# Patient Record
Sex: MALE | Race: WHITE | NOT HISPANIC OR LATINO
[De-identification: names, ages, dates, MRNs, and addresses within clinical notes are randomized per-mention and may not be internally consistent; named-entity substitution may affect disease eponyms.]

---

## 2017-01-05 ENCOUNTER — APPOINTMENT (OUTPATIENT)
Dept: NEUROSURGERY | Facility: CLINIC | Age: 67
End: 2017-01-05

## 2017-01-05 ENCOUNTER — OUTPATIENT (OUTPATIENT)
Dept: OUTPATIENT SERVICES | Facility: HOSPITAL | Age: 67
LOS: 1 days | End: 2017-01-05
Payer: COMMERCIAL

## 2017-01-05 DIAGNOSIS — E11.65 TYPE 2 DIABETES MELLITUS WITH HYPERGLYCEMIA: ICD-10-CM

## 2017-01-05 DIAGNOSIS — M54.16 RADICULOPATHY, LUMBAR REGION: ICD-10-CM

## 2017-01-05 PROCEDURE — 82962 GLUCOSE BLOOD TEST: CPT

## 2017-01-05 PROCEDURE — 77003 FLUOROGUIDE FOR SPINE INJECT: CPT

## 2017-01-05 PROCEDURE — 62323 NJX INTERLAMINAR LMBR/SAC: CPT

## 2017-01-17 ENCOUNTER — APPOINTMENT (OUTPATIENT)
Dept: NEUROSURGERY | Facility: CLINIC | Age: 67
End: 2017-01-17

## 2017-01-17 VITALS
WEIGHT: 215 LBS | SYSTOLIC BLOOD PRESSURE: 136 MMHG | DIASTOLIC BLOOD PRESSURE: 78 MMHG | BODY MASS INDEX: 31.84 KG/M2 | HEART RATE: 89 BPM | HEIGHT: 69 IN

## 2017-08-07 ENCOUNTER — OUTPATIENT (OUTPATIENT)
Dept: OUTPATIENT SERVICES | Facility: HOSPITAL | Age: 67
LOS: 1 days | End: 2017-08-07
Payer: COMMERCIAL

## 2017-08-07 ENCOUNTER — TRANSCRIPTION ENCOUNTER (OUTPATIENT)
Age: 67
End: 2017-08-07

## 2017-08-07 ENCOUNTER — APPOINTMENT (OUTPATIENT)
Dept: NEUROSURGERY | Facility: CLINIC | Age: 67
End: 2017-08-07
Payer: COMMERCIAL

## 2017-08-07 DIAGNOSIS — M54.16 RADICULOPATHY, LUMBAR REGION: ICD-10-CM

## 2017-08-07 PROCEDURE — 62323 NJX INTERLAMINAR LMBR/SAC: CPT

## 2017-08-07 PROCEDURE — 82962 GLUCOSE BLOOD TEST: CPT

## 2017-08-10 DIAGNOSIS — E11.65 TYPE 2 DIABETES MELLITUS WITH HYPERGLYCEMIA: ICD-10-CM

## 2017-08-31 ENCOUNTER — RX RENEWAL (OUTPATIENT)
Age: 67
End: 2017-08-31

## 2017-12-17 ENCOUNTER — RX RENEWAL (OUTPATIENT)
Age: 67
End: 2017-12-17

## 2018-01-23 ENCOUNTER — MESSAGE (OUTPATIENT)
Age: 68
End: 2018-01-23

## 2018-03-05 ENCOUNTER — TRANSCRIPTION ENCOUNTER (OUTPATIENT)
Age: 68
End: 2018-03-05

## 2018-03-05 ENCOUNTER — APPOINTMENT (OUTPATIENT)
Dept: NEUROSURGERY | Facility: CLINIC | Age: 68
End: 2018-03-05
Payer: COMMERCIAL

## 2018-03-05 ENCOUNTER — OUTPATIENT (OUTPATIENT)
Dept: OUTPATIENT SERVICES | Facility: HOSPITAL | Age: 68
LOS: 1 days | End: 2018-03-05
Payer: COMMERCIAL

## 2018-03-05 DIAGNOSIS — M54.16 RADICULOPATHY, LUMBAR REGION: ICD-10-CM

## 2018-03-05 PROCEDURE — 62323 NJX INTERLAMINAR LMBR/SAC: CPT

## 2018-03-05 PROCEDURE — 82962 GLUCOSE BLOOD TEST: CPT

## 2018-03-13 DIAGNOSIS — E11.65 TYPE 2 DIABETES MELLITUS WITH HYPERGLYCEMIA: ICD-10-CM

## 2018-03-20 ENCOUNTER — APPOINTMENT (OUTPATIENT)
Dept: NEUROSURGERY | Facility: CLINIC | Age: 68
End: 2018-03-20
Payer: COMMERCIAL

## 2018-03-20 VITALS
DIASTOLIC BLOOD PRESSURE: 90 MMHG | HEART RATE: 89 BPM | SYSTOLIC BLOOD PRESSURE: 157 MMHG | BODY MASS INDEX: 32.29 KG/M2 | HEIGHT: 69 IN | WEIGHT: 218 LBS

## 2018-03-20 PROCEDURE — 99213 OFFICE O/P EST LOW 20 MIN: CPT

## 2018-06-19 ENCOUNTER — RX RENEWAL (OUTPATIENT)
Age: 68
End: 2018-06-19

## 2018-09-03 ENCOUNTER — RX RENEWAL (OUTPATIENT)
Age: 68
End: 2018-09-03

## 2019-01-28 ENCOUNTER — MESSAGE (OUTPATIENT)
Age: 69
End: 2019-01-28

## 2019-02-06 ENCOUNTER — APPOINTMENT (OUTPATIENT)
Dept: NEUROSURGERY | Facility: CLINIC | Age: 69
End: 2019-02-06
Payer: COMMERCIAL

## 2019-02-06 VITALS
HEIGHT: 69 IN | HEART RATE: 96 BPM | SYSTOLIC BLOOD PRESSURE: 156 MMHG | BODY MASS INDEX: 32.44 KG/M2 | DIASTOLIC BLOOD PRESSURE: 79 MMHG | WEIGHT: 219 LBS

## 2019-02-06 PROCEDURE — 99213 OFFICE O/P EST LOW 20 MIN: CPT

## 2019-02-06 NOTE — ASSESSMENT
[FreeTextEntry1] : 68 year old male with low back and lumbar radicular pain now returned.  He will follow up with me within the week for his procedure.

## 2019-02-06 NOTE — REASON FOR VISIT
[Follow-Up: _____] : a [unfilled] follow-up visit [FreeTextEntry1] : Patient returns with a recurrence of his back and leg pain.  His last epidural injection was about 11 months ago.  He felt 60-70% improved as compared to prior the procedure.  His relief lasted about 9 months.  He is interested in a repeat injection.

## 2019-02-07 ENCOUNTER — APPOINTMENT (OUTPATIENT)
Dept: NEUROSURGERY | Facility: CLINIC | Age: 69
End: 2019-02-07

## 2019-02-27 ENCOUNTER — TRANSCRIPTION ENCOUNTER (OUTPATIENT)
Age: 69
End: 2019-02-27

## 2019-02-28 ENCOUNTER — APPOINTMENT (OUTPATIENT)
Dept: NEUROSURGERY | Facility: CLINIC | Age: 69
End: 2019-02-28
Payer: COMMERCIAL

## 2019-02-28 ENCOUNTER — OUTPATIENT (OUTPATIENT)
Dept: OUTPATIENT SERVICES | Facility: HOSPITAL | Age: 69
LOS: 1 days | End: 2019-02-28
Payer: COMMERCIAL

## 2019-02-28 DIAGNOSIS — M54.16 RADICULOPATHY, LUMBAR REGION: ICD-10-CM

## 2019-02-28 PROCEDURE — 62323 NJX INTERLAMINAR LMBR/SAC: CPT

## 2019-03-20 ENCOUNTER — APPOINTMENT (OUTPATIENT)
Dept: NEUROSURGERY | Facility: CLINIC | Age: 69
End: 2019-03-20
Payer: COMMERCIAL

## 2019-03-20 VITALS
SYSTOLIC BLOOD PRESSURE: 138 MMHG | HEART RATE: 85 BPM | BODY MASS INDEX: 32.44 KG/M2 | HEIGHT: 69 IN | DIASTOLIC BLOOD PRESSURE: 76 MMHG | WEIGHT: 219 LBS

## 2019-03-20 PROCEDURE — 99213 OFFICE O/P EST LOW 20 MIN: CPT

## 2019-03-20 NOTE — REASON FOR VISIT
[Follow-Up: _____] : a [unfilled] follow-up visit [FreeTextEntry1] : Patient returns after his most recent LESI.  He states that he feels significantly better.  His current pain intensity is 2/10.  He is here to discuss his progress.

## 2019-03-20 NOTE — ASSESSMENT
[FreeTextEntry1] : 68 year old male with low back and lumbar radicular pain now improved.  He will continue with his home exercises and return to see me at the earliest sign that his pain returns for further injection therapy as necessary.

## 2019-06-13 ENCOUNTER — RX RENEWAL (OUTPATIENT)
Age: 69
End: 2019-06-13

## 2019-07-28 ENCOUNTER — TRANSCRIPTION ENCOUNTER (OUTPATIENT)
Age: 69
End: 2019-07-28

## 2019-07-29 ENCOUNTER — APPOINTMENT (OUTPATIENT)
Dept: NEUROSURGERY | Facility: CLINIC | Age: 69
End: 2019-07-29
Payer: COMMERCIAL

## 2019-07-29 ENCOUNTER — OUTPATIENT (OUTPATIENT)
Dept: OUTPATIENT SERVICES | Facility: HOSPITAL | Age: 69
LOS: 1 days | End: 2019-07-29
Payer: COMMERCIAL

## 2019-07-29 DIAGNOSIS — M54.16 RADICULOPATHY, LUMBAR REGION: ICD-10-CM

## 2019-07-29 PROCEDURE — 62323 NJX INTERLAMINAR LMBR/SAC: CPT

## 2019-08-14 ENCOUNTER — APPOINTMENT (OUTPATIENT)
Dept: NEUROSURGERY | Facility: CLINIC | Age: 69
End: 2019-08-14
Payer: COMMERCIAL

## 2019-08-14 VITALS
BODY MASS INDEX: 31.55 KG/M2 | DIASTOLIC BLOOD PRESSURE: 84 MMHG | HEIGHT: 69 IN | SYSTOLIC BLOOD PRESSURE: 126 MMHG | HEART RATE: 88 BPM | WEIGHT: 213 LBS

## 2019-08-14 PROCEDURE — 99213 OFFICE O/P EST LOW 20 MIN: CPT

## 2019-08-14 NOTE — ASSESSMENT
[FreeTextEntry1] : 68 year old male with low back and lumbar radicular pain now improved.  He will continue with his home exercises and return to see me at the earliest sign that his pain returns for further injection therapy as necessary.  I will renew his Meloxicam.

## 2019-08-14 NOTE — REASON FOR VISIT
[Follow-Up: _____] : a [unfilled] follow-up visit [FreeTextEntry1] : Patient returns after his most recent LESI.  He states that he feels 60% improved following his procedure.  His pain level has reduced from 9/10 to 3/10.  He is here to discuss his progress.

## 2019-11-06 ENCOUNTER — RX RENEWAL (OUTPATIENT)
Age: 69
End: 2019-11-06

## 2019-11-08 ENCOUNTER — MEDICATION RENEWAL (OUTPATIENT)
Age: 69
End: 2019-11-08

## 2020-02-09 ENCOUNTER — TRANSCRIPTION ENCOUNTER (OUTPATIENT)
Age: 70
End: 2020-02-09

## 2020-02-10 ENCOUNTER — APPOINTMENT (OUTPATIENT)
Dept: NEUROSURGERY | Facility: CLINIC | Age: 70
End: 2020-02-10
Payer: COMMERCIAL

## 2020-02-10 ENCOUNTER — OUTPATIENT (OUTPATIENT)
Dept: OUTPATIENT SERVICES | Facility: HOSPITAL | Age: 70
LOS: 1 days | End: 2020-02-10
Payer: COMMERCIAL

## 2020-02-10 DIAGNOSIS — M54.16 RADICULOPATHY, LUMBAR REGION: ICD-10-CM

## 2020-02-10 PROCEDURE — 62323 NJX INTERLAMINAR LMBR/SAC: CPT

## 2020-02-10 PROCEDURE — 82962 GLUCOSE BLOOD TEST: CPT

## 2020-02-18 DIAGNOSIS — E11.65 TYPE 2 DIABETES MELLITUS WITH HYPERGLYCEMIA: ICD-10-CM

## 2020-02-28 ENCOUNTER — APPOINTMENT (OUTPATIENT)
Dept: NEUROSURGERY | Facility: CLINIC | Age: 70
End: 2020-02-28
Payer: COMMERCIAL

## 2020-02-28 VITALS
DIASTOLIC BLOOD PRESSURE: 87 MMHG | BODY MASS INDEX: 32.44 KG/M2 | HEART RATE: 98 BPM | SYSTOLIC BLOOD PRESSURE: 169 MMHG | HEIGHT: 69 IN | WEIGHT: 219 LBS

## 2020-02-28 PROCEDURE — 99213 OFFICE O/P EST LOW 20 MIN: CPT

## 2020-02-28 NOTE — REASON FOR VISIT
[Follow-Up: _____] : a [unfilled] follow-up visit [FreeTextEntry1] : Patient returns after his most recent LESI a few weeks ago.  He state that he feels 60% improved as compared to prior to his procedure.  He is here to discuss his progress.

## 2020-02-28 NOTE — ASSESSMENT
[FreeTextEntry1] : 69 year old male with low back and lumbar radicular pain now moderately improved.  He will continue with Meloxicam as needed for pain relief.  He will follow up with me at the earliest sign that his pain returns for further injection therapy as needed.

## 2020-05-21 ENCOUNTER — RX RENEWAL (OUTPATIENT)
Age: 70
End: 2020-05-21

## 2020-08-06 DIAGNOSIS — Z01.818 ENCOUNTER FOR OTHER PREPROCEDURAL EXAMINATION: ICD-10-CM

## 2020-08-07 ENCOUNTER — APPOINTMENT (OUTPATIENT)
Dept: DISASTER EMERGENCY | Facility: CLINIC | Age: 70
End: 2020-08-07

## 2020-08-07 LAB — SARS-COV-2 N GENE NPH QL NAA+PROBE: NOT DETECTED

## 2020-08-09 ENCOUNTER — TRANSCRIPTION ENCOUNTER (OUTPATIENT)
Age: 70
End: 2020-08-09

## 2020-08-10 ENCOUNTER — OUTPATIENT (OUTPATIENT)
Dept: OUTPATIENT SERVICES | Facility: HOSPITAL | Age: 70
LOS: 1 days | End: 2020-08-10
Payer: COMMERCIAL

## 2020-08-10 ENCOUNTER — APPOINTMENT (OUTPATIENT)
Dept: NEUROSURGERY | Facility: CLINIC | Age: 70
End: 2020-08-10
Payer: COMMERCIAL

## 2020-08-10 DIAGNOSIS — M54.16 RADICULOPATHY, LUMBAR REGION: ICD-10-CM

## 2020-08-10 PROCEDURE — 82962 GLUCOSE BLOOD TEST: CPT

## 2020-08-10 PROCEDURE — 62323 NJX INTERLAMINAR LMBR/SAC: CPT

## 2020-08-11 DIAGNOSIS — E11.65 TYPE 2 DIABETES MELLITUS WITH HYPERGLYCEMIA: ICD-10-CM

## 2020-08-26 ENCOUNTER — APPOINTMENT (OUTPATIENT)
Dept: NEUROSURGERY | Facility: CLINIC | Age: 70
End: 2020-08-26
Payer: COMMERCIAL

## 2020-08-26 VITALS
HEART RATE: 97 BPM | SYSTOLIC BLOOD PRESSURE: 167 MMHG | DIASTOLIC BLOOD PRESSURE: 83 MMHG | BODY MASS INDEX: 32.44 KG/M2 | HEIGHT: 69 IN | WEIGHT: 219 LBS

## 2020-08-26 VITALS — TEMPERATURE: 98.3 F

## 2020-08-26 PROCEDURE — 99213 OFFICE O/P EST LOW 20 MIN: CPT

## 2020-08-26 NOTE — ASSESSMENT
[FreeTextEntry1] : 69 year old male with low back and lumbar radicular pain now improved following his most recent LESI.  He will continue with Meloxicam as needed for pain and return to see me at the earliest sign that his pain returns for further injection therapy as necessary.

## 2020-08-26 NOTE — REASON FOR VISIT
[Follow-Up: _____] : a [unfilled] follow-up visit [FreeTextEntry1] : Patient returns after his most recent LESI.  He states he feels 70-75% improved as compared to prior to his procedure.  He is pleased with the results and returns for a follow up visit today.

## 2020-09-28 ENCOUNTER — APPOINTMENT (OUTPATIENT)
Dept: DISASTER EMERGENCY | Facility: CLINIC | Age: 70
End: 2020-09-28

## 2020-09-29 LAB — SARS-COV-2 N GENE NPH QL NAA+PROBE: NOT DETECTED

## 2020-09-30 ENCOUNTER — TRANSCRIPTION ENCOUNTER (OUTPATIENT)
Age: 70
End: 2020-09-30

## 2020-10-01 ENCOUNTER — OUTPATIENT (OUTPATIENT)
Dept: OUTPATIENT SERVICES | Facility: HOSPITAL | Age: 70
LOS: 1 days | Discharge: HOME | End: 2020-10-01

## 2020-10-01 LAB — GLUCOSE BLDC GLUCOMTR-MCNC: 165 MG/DL — HIGH (ref 70–99)

## 2020-11-16 ENCOUNTER — RX RENEWAL (OUTPATIENT)
Age: 70
End: 2020-11-16

## 2021-11-08 ENCOUNTER — APPOINTMENT (OUTPATIENT)
Dept: DISASTER EMERGENCY | Facility: CLINIC | Age: 71
End: 2021-11-08

## 2021-11-08 LAB — SARS-COV-2 N GENE NPH QL NAA+PROBE: NOT DETECTED

## 2021-11-09 ENCOUNTER — APPOINTMENT (OUTPATIENT)
Dept: PHYSICAL MEDICINE AND REHAB | Facility: CLINIC | Age: 71
End: 2021-11-09

## 2021-11-10 ENCOUNTER — TRANSCRIPTION ENCOUNTER (OUTPATIENT)
Age: 71
End: 2021-11-10

## 2021-11-11 ENCOUNTER — APPOINTMENT (OUTPATIENT)
Dept: PHYSICAL MEDICINE AND REHAB | Facility: AMBULATORY MEDICAL SERVICES | Age: 71
End: 2021-11-11
Payer: MEDICARE

## 2021-11-11 ENCOUNTER — OUTPATIENT (OUTPATIENT)
Dept: OUTPATIENT SERVICES | Facility: HOSPITAL | Age: 71
LOS: 1 days | End: 2021-11-11
Payer: MEDICARE

## 2021-11-11 DIAGNOSIS — M54.16 RADICULOPATHY, LUMBAR REGION: ICD-10-CM

## 2021-11-11 PROCEDURE — 62323 NJX INTERLAMINAR LMBR/SAC: CPT

## 2021-12-08 ENCOUNTER — APPOINTMENT (OUTPATIENT)
Dept: PHYSICAL MEDICINE AND REHAB | Facility: CLINIC | Age: 71
End: 2021-12-08
Payer: MEDICARE

## 2021-12-08 PROCEDURE — 99213 OFFICE O/P EST LOW 20 MIN: CPT

## 2021-12-08 NOTE — ASSESSMENT
[FreeTextEntry1] : 70 year old male with low back and lumbar radicular pain now significantly improved following his most recent LESI.  He will continue with his Meloxicam and I will provide with a refill.  He will follow up with me at the earliest sign that his pain returns for further injection therapy as necessary.

## 2021-12-08 NOTE — HISTORY OF PRESENT ILLNESS
[FreeTextEntry1] : Patient returns after his most recent LESI a few weeks ago.  The patient reports 60% improvement in the symptoms.  The patient is pleased with the results and returns for a follow up visit today.\par

## 2022-02-08 ENCOUNTER — RX RENEWAL (OUTPATIENT)
Age: 72
End: 2022-02-08

## 2022-09-20 LAB — SARS-COV-2 N GENE NPH QL NAA+PROBE: NOT DETECTED

## 2022-09-22 ENCOUNTER — APPOINTMENT (OUTPATIENT)
Dept: PHYSICAL MEDICINE AND REHAB | Facility: CLINIC | Age: 72
End: 2022-09-22

## 2022-09-22 ENCOUNTER — OUTPATIENT (OUTPATIENT)
Dept: OUTPATIENT SERVICES | Facility: HOSPITAL | Age: 72
LOS: 1 days | End: 2022-09-22
Payer: MEDICARE

## 2022-09-22 DIAGNOSIS — M54.16 RADICULOPATHY, LUMBAR REGION: ICD-10-CM

## 2022-09-22 PROCEDURE — 62323 NJX INTERLAMINAR LMBR/SAC: CPT

## 2022-10-07 ENCOUNTER — APPOINTMENT (OUTPATIENT)
Dept: PHYSICAL MEDICINE AND REHAB | Facility: CLINIC | Age: 72
End: 2022-10-07

## 2022-10-07 VITALS
DIASTOLIC BLOOD PRESSURE: 80 MMHG | SYSTOLIC BLOOD PRESSURE: 167 MMHG | HEART RATE: 81 BPM | TEMPERATURE: 97.52 F | OXYGEN SATURATION: 95 %

## 2022-10-07 PROCEDURE — 99213 OFFICE O/P EST LOW 20 MIN: CPT

## 2022-10-07 NOTE — ASSESSMENT
[FreeTextEntry1] : 71 year old male with low back and lumbar radicular pain now moderately improved following his first LESI this year.  He will continue with his home exercises and return to see me at the earliest sign that his pain returns for further injection therapy as necessary.

## 2022-10-07 NOTE — HISTORY OF PRESENT ILLNESS
[FreeTextEntry1] : Patient returns after his first LESI this year a few weeks ago.  The patient reports 70% improvement in the symptoms.  The patient is pleased with the results and returns for a follow up visit today.\par

## 2023-07-25 ENCOUNTER — NON-APPOINTMENT (OUTPATIENT)
Age: 73
End: 2023-07-25

## 2023-07-27 ENCOUNTER — APPOINTMENT (OUTPATIENT)
Dept: PHYSICAL MEDICINE AND REHAB | Facility: CLINIC | Age: 73
End: 2023-07-27
Payer: MEDICARE

## 2023-07-27 ENCOUNTER — OUTPATIENT (OUTPATIENT)
Dept: OUTPATIENT SERVICES | Facility: HOSPITAL | Age: 73
LOS: 1 days | End: 2023-07-27
Payer: MEDICARE

## 2023-07-27 DIAGNOSIS — M54.16 RADICULOPATHY, LUMBAR REGION: ICD-10-CM

## 2023-07-27 PROCEDURE — 62323 NJX INTERLAMINAR LMBR/SAC: CPT

## 2023-07-27 PROCEDURE — 82962 GLUCOSE BLOOD TEST: CPT

## 2023-07-27 NOTE — PROCEDURE
[de-identified] : [facility]\par [facility]\par [address], [place], [address] - [phone]\par \par PATIENT: GARY SCHNEIDER \par MEDICAL RECORD #: \par DATE OF OPERATION: 07/27/2023 \par PREOPERATIVE DIAGNOSIS:  LUMBAR RADICULAR PAIN\par POSTOPERATIVE DIAGNOSIS:  LUMBAR RADICULAR PAIN\par PROCEDURE: LUMBAR EPIDURAL STEROID INJECTION UNDER X-RAY GUIDANCE\par SURGEON:  DARWIN BROWN M.D.\par ANESTHEISA:  LOCAL\par EBL:  MINIMAL\par \par INDICATIONS:  The patient returns to Pain Management with persistent lower back pain with radiation down the left leg.  The pain has remained quite significant and interferes with the patient’s ability to perform ADLs despite the implementation of other conservative measures.  I discussed with the patient at length the risks, benefits, and expectations of the aforementioned procedure.  All of the patient’s questions were answered.  The patient signed informed consent and agreed to proceed.\par \par PROCEDURE:  The patient was transported to the operating room, placed in the prone position and monitored non-invasively with stable vital signs and no complaints.  The patient’s back was prepped three times with betadine and draped in meticulous sterile fashion.  The L4-L5 interspace was identified fluoroscopically and the skin overlying this level was infiltrated with 5cc of 1% preservative-free lidocaine using a 25 gauge one inch needle.  The epidural space was approached and entered at this level with a 20 gauge Tuohy needle by loss of resistance technique.\par \par Following loss of resistance to air, aspiration was negative for CSF or heme.  Then, 2cc of Omnipaque 240 were injected and the epidurogram revealed spread from L4 to S1 in the posterior epidural space bilaterally with left-sided predominance and no distinct cutoff.  Depo-Medrol 80mg was then injected with 1cc of preservative-free 1% lidocaine.  The needle tract was flushed with 2cc of 1% lidocaine and the needle was removed.  A sterile bandage was applied.\par \par The patient tolerated the procedure well without complaint or complication.  The patient was observed in stable condition after the procedure for approximately 30 minutes before being discharged to home in the company of an adult.  The patient was provided with full written instructions.  The patient will be seen for follow-up in my office in 1 week but certainly sooner with any questions or concerns.\par \par \par \par Darwin Brown M.D.\par \par \par

## 2023-07-28 DIAGNOSIS — E11.65 TYPE 2 DIABETES MELLITUS WITH HYPERGLYCEMIA: ICD-10-CM

## 2023-08-11 ENCOUNTER — APPOINTMENT (OUTPATIENT)
Dept: PHYSICAL MEDICINE AND REHAB | Facility: CLINIC | Age: 73
End: 2023-08-11
Payer: MEDICARE

## 2023-08-11 PROCEDURE — 99213 OFFICE O/P EST LOW 20 MIN: CPT

## 2023-08-11 NOTE — ASSESSMENT
[FreeTextEntry1] : 72 year old male with low back pain and lumbar radicular pain now significantly improved following his most recent injection.  I will renew his Meloxicam.  He will return to see me at the earliest sign that his pain returns for further injection therapy as necessary.

## 2023-11-06 ENCOUNTER — OUTPATIENT (OUTPATIENT)
Dept: OUTPATIENT SERVICES | Facility: HOSPITAL | Age: 73
LOS: 1 days | End: 2023-11-06
Payer: MEDICARE

## 2023-11-06 ENCOUNTER — APPOINTMENT (OUTPATIENT)
Dept: PHYSICAL MEDICINE AND REHAB | Facility: CLINIC | Age: 73
End: 2023-11-06
Payer: MEDICARE

## 2023-11-06 DIAGNOSIS — M54.16 RADICULOPATHY, LUMBAR REGION: ICD-10-CM

## 2023-11-06 PROCEDURE — 62323 NJX INTERLAMINAR LMBR/SAC: CPT

## 2023-11-21 ENCOUNTER — APPOINTMENT (OUTPATIENT)
Dept: PHYSICAL MEDICINE AND REHAB | Facility: CLINIC | Age: 73
End: 2023-11-21
Payer: MEDICARE

## 2023-11-21 PROCEDURE — 99214 OFFICE O/P EST MOD 30 MIN: CPT

## 2023-11-27 ENCOUNTER — APPOINTMENT (OUTPATIENT)
Dept: PHYSICAL MEDICINE AND REHAB | Facility: CLINIC | Age: 73
End: 2023-11-27
Payer: MEDICARE

## 2023-11-27 ENCOUNTER — OUTPATIENT (OUTPATIENT)
Dept: OUTPATIENT SERVICES | Facility: HOSPITAL | Age: 73
LOS: 1 days | End: 2023-11-27
Payer: MEDICARE

## 2023-11-27 DIAGNOSIS — M54.16 RADICULOPATHY, LUMBAR REGION: ICD-10-CM

## 2023-11-27 PROCEDURE — 62323 NJX INTERLAMINAR LMBR/SAC: CPT

## 2023-12-12 ENCOUNTER — APPOINTMENT (OUTPATIENT)
Dept: PHYSICAL MEDICINE AND REHAB | Facility: CLINIC | Age: 73
End: 2023-12-12
Payer: MEDICARE

## 2023-12-12 PROCEDURE — 99213 OFFICE O/P EST LOW 20 MIN: CPT

## 2023-12-14 ENCOUNTER — OUTPATIENT (OUTPATIENT)
Dept: OUTPATIENT SERVICES | Facility: HOSPITAL | Age: 73
LOS: 1 days | End: 2023-12-14
Payer: MEDICARE

## 2023-12-14 ENCOUNTER — APPOINTMENT (OUTPATIENT)
Dept: MRI IMAGING | Facility: CLINIC | Age: 73
End: 2023-12-14
Payer: MEDICARE

## 2023-12-14 DIAGNOSIS — M54.16 RADICULOPATHY, LUMBAR REGION: ICD-10-CM

## 2023-12-14 DIAGNOSIS — M47.816 SPONDYLOSIS WITHOUT MYELOPATHY OR RADICULOPATHY, LUMBAR REGION: ICD-10-CM

## 2023-12-14 DIAGNOSIS — M48.061 SPINAL STENOSIS, LUMBAR REGION WITHOUT NEUROGENIC CLAUDICATION: ICD-10-CM

## 2023-12-14 DIAGNOSIS — M54.50 LOW BACK PAIN, UNSPECIFIED: ICD-10-CM

## 2023-12-14 PROCEDURE — 72148 MRI LUMBAR SPINE W/O DYE: CPT | Mod: 26,MH

## 2023-12-14 PROCEDURE — 72148 MRI LUMBAR SPINE W/O DYE: CPT

## 2024-02-15 ENCOUNTER — OUTPATIENT (OUTPATIENT)
Dept: OUTPATIENT SERVICES | Facility: HOSPITAL | Age: 74
LOS: 1 days | End: 2024-02-15
Payer: MEDICARE

## 2024-02-15 ENCOUNTER — APPOINTMENT (OUTPATIENT)
Dept: PHYSICAL MEDICINE AND REHAB | Facility: CLINIC | Age: 74
End: 2024-02-15
Payer: MEDICARE

## 2024-02-15 DIAGNOSIS — M54.16 RADICULOPATHY, LUMBAR REGION: ICD-10-CM

## 2024-02-15 PROCEDURE — 62323 NJX INTERLAMINAR LMBR/SAC: CPT

## 2024-02-15 NOTE — PROCEDURE
[de-identified] : [facility] [address], [place], [address] - [phone]  PATIENT: GARY SCHNEIDER  MEDICAL RECORD #:  DATE OF OPERATION: 02/15/2024  PREOPERATIVE DIAGNOSIS:  LUMBAR RADICULAR PAIN POSTOPERATIVE DIAGNOSIS:  LUMBAR RADICULAR PAIN PROCEDURE: LUMBAR EPIDURAL STEROID INJECTION UNDER X-RAY GUIDANCE SURGEON:  DARWIN BROWN M.D. ANESTHEISA:  LOCAL EBL:  MINIMAL  INDICATIONS:  The patient returns to Pain Management with persistent lower back pain with radiation down the left leg.  The pain has remained quite significant and interferes with the patients ability to perform ADLs despite the implementation of other conservative measures.  I discussed with the patient at length the risks, benefits, and expectations of the aforementioned procedure.  All of the patients questions were answered.  The patient signed informed consent and agreed to proceed.  PROCEDURE:  The patient was transported to the operating room, placed in the prone position and monitored non-invasively with stable vital signs and no complaints.  The patients back was prepped three times with betadine and draped in meticulous sterile fashion.  The L4-L5 interspace was identified fluoroscopically and the skin overlying this level was infiltrated with 5cc of 1% preservative-free lidocaine using a 25 gauge one inch needle.  The epidural space was approached and entered at this level with a 20 gauge Tuohy needle by loss of resistance technique.  Following loss of resistance to air, aspiration was negative for CSF or heme.  Then, 2cc of Omnipaque 240 were injected and the epidurogram revealed spread from L4 to S1 in the posterior epidural space bilaterally with left-sided predominance and no distinct cutoff.  Depo-Medrol 80mg was then injected with 1cc of preservative-free 1% lidocaine.  The needle tract was flushed with 2cc of 1% lidocaine and the needle was removed.  A sterile bandage was applied.  The patient tolerated the procedure well without complaint or complication.  The patient was observed in stable condition after the procedure for approximately 30 minutes before being discharged to home in the company of an adult.  The patient was provided with full written instructions.  The patient will be seen for follow-up in my office in 1 week but certainly sooner with any questions or concerns.    Darwin Brown M.D.

## 2024-03-01 ENCOUNTER — APPOINTMENT (OUTPATIENT)
Dept: PHYSICAL MEDICINE AND REHAB | Facility: CLINIC | Age: 74
End: 2024-03-01
Payer: MEDICARE

## 2024-03-01 PROCEDURE — 99213 OFFICE O/P EST LOW 20 MIN: CPT

## 2024-03-01 NOTE — HISTORY OF PRESENT ILLNESS
[FreeTextEntry1] : Patient returns after the most recent LESI a few weeks ago.  The patient reports 70-80% improvement in the symptoms.  The patient is pleased with the results and returns for a follow up visit today.  He is also been going to PT which seems to be helping.

## 2024-03-01 NOTE — ASSESSMENT
[FreeTextEntry1] : 73 year old male with low back pain and lumbar radicular pain now significantly improved.  He will continue with PT and I will renew his prescription.  He will follow up with me thereafter as necessary.

## 2024-05-09 ENCOUNTER — APPOINTMENT (OUTPATIENT)
Dept: PHYSICAL MEDICINE AND REHAB | Facility: CLINIC | Age: 74
End: 2024-05-09
Payer: MEDICARE

## 2024-05-09 ENCOUNTER — OUTPATIENT (OUTPATIENT)
Dept: OUTPATIENT SERVICES | Facility: HOSPITAL | Age: 74
LOS: 1 days | End: 2024-05-09
Payer: MEDICARE

## 2024-05-09 DIAGNOSIS — M54.16 RADICULOPATHY, LUMBAR REGION: ICD-10-CM

## 2024-05-09 PROCEDURE — 62323 NJX INTERLAMINAR LMBR/SAC: CPT

## 2024-05-09 PROCEDURE — 82962 GLUCOSE BLOOD TEST: CPT

## 2024-05-09 NOTE — PROCEDURE
[de-identified] : [facility] [address], [place], [address] - [phone]  PATIENT: GARY SCHNEIDER  MEDICAL RECORD #:  DATE OF OPERATION: 05/09/2024  PREOPERATIVE DIAGNOSIS:  LUMBAR RADICULAR PAIN POSTOPERATIVE DIAGNOSIS:  LUMBAR RADICULAR PAIN PROCEDURE: LUMBAR EPIDURAL STEROID INJECTION UNDER X-RAY GUIDANCE SURGEON:  DARWIN BROWN M.D. ANESTHEISA:  LOCAL EBL:  MINIMAL  INDICATIONS:  The patient returns to Pain Management with persistent lower back pain with radiation down the left leg.  The pain has remained quite significant and interferes with the patients ability to perform ADLs despite the implementation of other conservative measures.  I discussed with the patient at length the risks, benefits, and expectations of the aforementioned procedure.  All of the patients questions were answered.  The patient signed informed consent and agreed to proceed.  PROCEDURE:  The patient was transported to the operating room, placed in the prone position and monitored non-invasively with stable vital signs and no complaints.  The patients back was prepped three times with betadine and draped in meticulous sterile fashion.  The L4-L5 interspace was identified fluoroscopically and the skin overlying this level was infiltrated with 5cc of 1% preservative-free lidocaine using a 25 gauge one inch needle.  The epidural space was approached and entered at this level with a 20 gauge Tuohy needle by loss of resistance technique.  Following loss of resistance to air, aspiration was negative for CSF or heme.  Then, 2cc of Omnipaque 240 were injected and the epidurogram revealed spread from L4 to S1 in the posterior epidural space bilaterally with left-sided predominance and no distinct cutoff.  Depo-Medrol 80mg was then injected with 1cc of preservative-free 1% lidocaine.  The needle tract was flushed with 2cc of 1% lidocaine and the needle was removed.  A sterile bandage was applied.  The patient tolerated the procedure well without complaint or complication.  The patient was observed in stable condition after the procedure for approximately 30 minutes before being discharged to home in the company of an adult.  The patient was provided with full written instructions.  The patient will be seen for follow-up in my office in 1 week but certainly sooner with any questions or concerns.    Darwin Brown M.D.

## 2024-05-13 DIAGNOSIS — E11.65 TYPE 2 DIABETES MELLITUS WITH HYPERGLYCEMIA: ICD-10-CM

## 2024-05-22 ENCOUNTER — APPOINTMENT (OUTPATIENT)
Dept: PHYSICAL MEDICINE AND REHAB | Facility: CLINIC | Age: 74
End: 2024-05-22
Payer: MEDICARE

## 2024-05-22 DIAGNOSIS — M54.50 LOW BACK PAIN, UNSPECIFIED: ICD-10-CM

## 2024-05-22 DIAGNOSIS — M47.816 SPONDYLOSIS W/OUT MYELOPATHY OR RADICULOPATHY, LUMBAR REGION: ICD-10-CM

## 2024-05-22 DIAGNOSIS — M54.16 RADICULOPATHY, LUMBAR REGION: ICD-10-CM

## 2024-05-22 DIAGNOSIS — M48.061 SPINAL STENOSIS, LUMBAR REGION WITHOUT NEUROGENIC CLAUDICATION: ICD-10-CM

## 2024-05-22 PROCEDURE — 99213 OFFICE O/P EST LOW 20 MIN: CPT

## 2024-05-22 NOTE — HISTORY OF PRESENT ILLNESS
[FreeTextEntry1] : Patient returns after the most recent LESI a few weeks ago.  The patient reports 60-65% improvement in the symptoms.  The patient is pleased with the results and returns for a follow up visit today.  His current pain level is 3/10.

## 2024-05-22 NOTE — ASSESSMENT
[FreeTextEntry1] : 73 year old male with low back and lumbar radicular pain now moderately improved following his most recent LESI.  I will renew his PT and Meloxicam.  He will follow up with me as needed.

## 2024-09-23 ENCOUNTER — NON-APPOINTMENT (OUTPATIENT)
Age: 74
End: 2024-09-23

## 2024-09-23 ENCOUNTER — OUTPATIENT (OUTPATIENT)
Dept: OUTPATIENT SERVICES | Facility: HOSPITAL | Age: 74
LOS: 1 days | End: 2024-09-23
Payer: MEDICARE

## 2024-09-23 ENCOUNTER — APPOINTMENT (OUTPATIENT)
Dept: PHYSICAL MEDICINE AND REHAB | Facility: CLINIC | Age: 74
End: 2024-09-23
Payer: MEDICARE

## 2024-09-23 DIAGNOSIS — M54.50 LOW BACK PAIN, UNSPECIFIED: ICD-10-CM

## 2024-09-23 DIAGNOSIS — M47.816 SPONDYLOSIS W/OUT MYELOPATHY OR RADICULOPATHY, LUMBAR REGION: ICD-10-CM

## 2024-09-23 DIAGNOSIS — M48.061 SPINAL STENOSIS, LUMBAR REGION WITHOUT NEUROGENIC CLAUDICATION: ICD-10-CM

## 2024-09-23 DIAGNOSIS — M54.16 RADICULOPATHY, LUMBAR REGION: ICD-10-CM

## 2024-09-23 PROCEDURE — 82962 GLUCOSE BLOOD TEST: CPT

## 2024-09-23 PROCEDURE — 62323 NJX INTERLAMINAR LMBR/SAC: CPT

## 2024-09-23 NOTE — PROCEDURE
[de-identified] : [facility] [address] [place], [place], [address] - [phone]  PATIENT: GARY SCHNEIDER  MEDICAL RECORD #:  DATE OF OPERATION: 09/23/2024  PREOPERATIVE DIAGNOSIS:  LUMBAR RADICULAR PAIN POSTOPERATIVE DIAGNOSIS:  LUMBAR RADICULAR PAIN PROCEDURE: LUMBAR EPIDURAL STEROID INJECTION UNDER X-RAY GUIDANCE SURGEON:  DARWIN BROWN M.D. ANESTHEISA:  LOCAL EBL:  MINIMAL  INDICATIONS:  The patient returns to Pain Management with persistent lower back pain with radiation down the left leg.  The pain has remained quite significant and interferes with the patients ability to perform ADLs despite the implementation of other conservative measures.  I discussed with the patient at length the risks, benefits, and expectations of the aforementioned procedure.  All of the patients questions were answered.  The patient signed informed consent and agreed to proceed.  PROCEDURE:  The patient was transported to the operating room, placed in the prone position and monitored non-invasively with stable vital signs and no complaints.  The patients back was prepped three times with betadine and draped in meticulous sterile fashion.  The L4-L5 interspace was identified fluoroscopically and the skin overlying this level was infiltrated with 5cc of 1% preservative-free lidocaine using a 25 gauge one inch needle.  The epidural space was approached and entered at this level with a 20 gauge Tuohy needle by loss of resistance technique.  Following loss of resistance to air, aspiration was negative for CSF or heme.  Then, 2cc of Omnipaque 240 were injected and the epidurogram revealed spread from L4 to S1 in the posterior epidural space bilaterally with left-sided predominance and no distinct cutoff.  Depo-Medrol 80mg was then injected with 1cc of preservative-free 1% lidocaine.  The needle tract was flushed with 2cc of 1% lidocaine and the needle was removed.  A sterile bandage was applied.  The patient tolerated the procedure well without complaint or complication.  The patient was observed in stable condition after the procedure for approximately 30 minutes before being discharged to home in the company of an adult.  The patient was provided with full written instructions.  The patient will be seen for follow-up in my office in 1 week but certainly sooner with any questions or concerns.    Darwin Brown M.D.

## 2024-09-24 DIAGNOSIS — E11.65 TYPE 2 DIABETES MELLITUS WITH HYPERGLYCEMIA: ICD-10-CM

## 2024-10-11 ENCOUNTER — APPOINTMENT (OUTPATIENT)
Dept: PHYSICAL MEDICINE AND REHAB | Facility: CLINIC | Age: 74
End: 2024-10-11

## 2024-10-16 ENCOUNTER — APPOINTMENT (OUTPATIENT)
Dept: PHYSICAL MEDICINE AND REHAB | Facility: CLINIC | Age: 74
End: 2024-10-16
Payer: MEDICARE

## 2024-10-16 DIAGNOSIS — M54.50 LOW BACK PAIN, UNSPECIFIED: ICD-10-CM

## 2024-10-16 DIAGNOSIS — M48.061 SPINAL STENOSIS, LUMBAR REGION WITHOUT NEUROGENIC CLAUDICATION: ICD-10-CM

## 2024-10-16 DIAGNOSIS — M47.816 SPONDYLOSIS W/OUT MYELOPATHY OR RADICULOPATHY, LUMBAR REGION: ICD-10-CM

## 2024-10-16 DIAGNOSIS — M54.16 RADICULOPATHY, LUMBAR REGION: ICD-10-CM

## 2024-10-16 PROCEDURE — 99213 OFFICE O/P EST LOW 20 MIN: CPT

## 2025-01-16 ENCOUNTER — OUTPATIENT (OUTPATIENT)
Dept: OUTPATIENT SERVICES | Facility: HOSPITAL | Age: 75
LOS: 1 days | End: 2025-01-16
Payer: MEDICARE

## 2025-01-16 ENCOUNTER — APPOINTMENT (OUTPATIENT)
Dept: PHYSICAL MEDICINE AND REHAB | Facility: CLINIC | Age: 75
End: 2025-01-16
Payer: MEDICARE

## 2025-01-16 DIAGNOSIS — M48.061 SPINAL STENOSIS, LUMBAR REGION WITHOUT NEUROGENIC CLAUDICATION: ICD-10-CM

## 2025-01-16 DIAGNOSIS — M54.16 RADICULOPATHY, LUMBAR REGION: ICD-10-CM

## 2025-01-16 DIAGNOSIS — M47.816 SPONDYLOSIS W/OUT MYELOPATHY OR RADICULOPATHY, LUMBAR REGION: ICD-10-CM

## 2025-01-16 DIAGNOSIS — M54.50 LOW BACK PAIN, UNSPECIFIED: ICD-10-CM

## 2025-01-16 PROCEDURE — 62323 NJX INTERLAMINAR LMBR/SAC: CPT

## 2025-02-07 ENCOUNTER — APPOINTMENT (OUTPATIENT)
Dept: PHYSICAL MEDICINE AND REHAB | Facility: CLINIC | Age: 75
End: 2025-02-07
Payer: MEDICARE

## 2025-02-07 DIAGNOSIS — M48.061 SPINAL STENOSIS, LUMBAR REGION WITHOUT NEUROGENIC CLAUDICATION: ICD-10-CM

## 2025-02-07 DIAGNOSIS — M47.816 SPONDYLOSIS W/OUT MYELOPATHY OR RADICULOPATHY, LUMBAR REGION: ICD-10-CM

## 2025-02-07 DIAGNOSIS — M54.50 LOW BACK PAIN, UNSPECIFIED: ICD-10-CM

## 2025-02-07 DIAGNOSIS — M54.16 RADICULOPATHY, LUMBAR REGION: ICD-10-CM

## 2025-02-07 PROCEDURE — 99213 OFFICE O/P EST LOW 20 MIN: CPT

## 2025-07-14 ENCOUNTER — APPOINTMENT (OUTPATIENT)
Dept: PHYSICAL MEDICINE AND REHAB | Facility: CLINIC | Age: 75
End: 2025-07-14
Payer: MEDICARE

## 2025-07-14 ENCOUNTER — OUTPATIENT (OUTPATIENT)
Dept: OUTPATIENT SERVICES | Facility: HOSPITAL | Age: 75
LOS: 1 days | End: 2025-07-14
Payer: MEDICARE

## 2025-07-14 DIAGNOSIS — M54.16 RADICULOPATHY, LUMBAR REGION: ICD-10-CM

## 2025-07-14 PROCEDURE — 62323 NJX INTERLAMINAR LMBR/SAC: CPT

## 2025-07-30 ENCOUNTER — NON-APPOINTMENT (OUTPATIENT)
Age: 75
End: 2025-07-30

## 2025-08-01 ENCOUNTER — APPOINTMENT (OUTPATIENT)
Dept: PHYSICAL MEDICINE AND REHAB | Facility: CLINIC | Age: 75
End: 2025-08-01
Payer: MEDICARE

## 2025-08-01 DIAGNOSIS — M54.50 LOW BACK PAIN, UNSPECIFIED: ICD-10-CM

## 2025-08-01 DIAGNOSIS — M47.816 SPONDYLOSIS W/OUT MYELOPATHY OR RADICULOPATHY, LUMBAR REGION: ICD-10-CM

## 2025-08-01 DIAGNOSIS — M54.16 RADICULOPATHY, LUMBAR REGION: ICD-10-CM

## 2025-08-01 DIAGNOSIS — M48.061 SPINAL STENOSIS, LUMBAR REGION WITHOUT NEUROGENIC CLAUDICATION: ICD-10-CM

## 2025-08-01 PROCEDURE — 99213 OFFICE O/P EST LOW 20 MIN: CPT
